# Patient Record
Sex: FEMALE | Race: WHITE | NOT HISPANIC OR LATINO | Employment: OTHER | URBAN - METROPOLITAN AREA
[De-identification: names, ages, dates, MRNs, and addresses within clinical notes are randomized per-mention and may not be internally consistent; named-entity substitution may affect disease eponyms.]

---

## 2020-03-06 NOTE — PATIENT DISCUSSION
RAE OU - LONGSTANDING. [facility]. [facility] ON S/S OF [address] MONITOR.

## 2020-05-22 NOTE — PATIENT DISCUSSION
EYELID INFLAMMATION RUL/TAHMINA - SUSPECT 2' EARLY STYE. NO FB OR LESION PRESENT ON LID INVERSION. RX MAXITROL 1 GTT TID X 1 WEEK THEN D/C. RX PRES FREE ATS QHOURLY X 24-48 HOURS THEN QID/PRN. ADVISED IF SWELLING WORSEN OR STYE BEGINS TO CALL AND WILL PLACE OR ANTIBIOTIC TO PHARMACY PENDING SYMPTOMS. MONITOR.

## 2020-05-22 NOTE — PATIENT DISCUSSION
New Prescription: Maxitrol (neomycin-polymyxin-dexameth): drops,suspension: 3.5-10,000-0.1 mg/mL-unit/mL-% 1 drop three times a day as directed into right eye 05-

## 2021-03-30 ENCOUNTER — CATARACT EVALUATION (OUTPATIENT)
Dept: URBAN - METROPOLITAN AREA CLINIC 39 | Facility: CLINIC | Age: 71
End: 2021-03-30

## 2021-03-30 DIAGNOSIS — H35.033: ICD-10-CM

## 2021-03-30 DIAGNOSIS — H43.813: ICD-10-CM

## 2021-03-30 DIAGNOSIS — H40.033: ICD-10-CM

## 2021-03-30 DIAGNOSIS — H18.513: ICD-10-CM

## 2021-03-30 DIAGNOSIS — H25.812: ICD-10-CM

## 2021-03-30 DIAGNOSIS — H25.811: ICD-10-CM

## 2021-03-30 DIAGNOSIS — H35.363: ICD-10-CM

## 2021-03-30 PROCEDURE — 92136TC INTERFEROMETRY - TECHNICAL COMPONENT

## 2021-03-30 PROCEDURE — 92025-1 CORNEAL TOPOGRAPHY, INS

## 2021-03-30 PROCEDURE — 92134 CPTRZ OPH DX IMG PST SGM RTA: CPT

## 2021-03-30 PROCEDURE — 92250 FUNDUS PHOTOGRAPHY W/I&R: CPT

## 2021-03-30 PROCEDURE — 92286 ANT SGM IMG I&R SPECLR MIC: CPT

## 2021-03-30 PROCEDURE — 99204 OFFICE O/P NEW MOD 45 MIN: CPT

## 2021-03-30 PROCEDURE — V2799PMN IMPRIMIS PRED-MOXI-NEPAF 5ML

## 2021-03-30 PROCEDURE — 92132 CPTRZD OPH DX IMG ANT SGM: CPT

## 2021-03-30 PROCEDURE — 92015 DETERMINE REFRACTIVE STATE: CPT

## 2021-03-30 ASSESSMENT — VISUAL ACUITY
OS_BAT: 20/80
OS_CC: 20/40+1
OD_CC: 20/25-2
OD_SC: 20/100
OD_BAT: 20/70
OS_SC: >J12
OD_SC: >J12
OS_CC: J4
OD_CC: J2
OS_SC: 20/100+1

## 2021-03-30 ASSESSMENT — TONOMETRY
OS_IOP_MMHG: 14
OD_IOP_MMHG: 14

## 2021-04-14 ENCOUNTER — TECH ONLY (OUTPATIENT)
Dept: URBAN - METROPOLITAN AREA CLINIC 39 | Facility: CLINIC | Age: 71
End: 2021-04-14

## 2021-04-14 ENCOUNTER — SURGERY/PROCEDURE (OUTPATIENT)
Dept: URBAN - METROPOLITAN AREA CLINIC 39 | Facility: CLINIC | Age: 71
End: 2021-04-14

## 2021-04-14 ENCOUNTER — ESTABLISHED PATIENT (OUTPATIENT)
Dept: URBAN - METROPOLITAN AREA SURGERY 14 | Facility: SURGERY | Age: 71
End: 2021-04-14

## 2021-04-14 DIAGNOSIS — H18.513: ICD-10-CM

## 2021-04-14 DIAGNOSIS — H25.811: ICD-10-CM

## 2021-04-14 DIAGNOSIS — H35.033: ICD-10-CM

## 2021-04-14 DIAGNOSIS — H40.033: ICD-10-CM

## 2021-04-14 DIAGNOSIS — Z96.1: ICD-10-CM

## 2021-04-14 DIAGNOSIS — H25.812: ICD-10-CM

## 2021-04-14 DIAGNOSIS — H43.813: ICD-10-CM

## 2021-04-14 DIAGNOSIS — H35.363: ICD-10-CM

## 2021-04-14 PROCEDURE — 6698454 REMOVE CATARACT;INSERT LENS (SX ONLY)

## 2021-04-14 PROCEDURE — 99211T TECH SERVICE

## 2021-04-14 PROCEDURE — 99211HP H&P OFFICE/OUTPATIENT VISIT, EST

## 2021-04-14 ASSESSMENT — TONOMETRY: OD_IOP_MMHG: 20

## 2021-04-14 ASSESSMENT — VISUAL ACUITY: OD_SC: 20/40

## 2021-05-05 ENCOUNTER — ESTABLISHED PATIENT (OUTPATIENT)
Dept: URBAN - METROPOLITAN AREA SURGERY 14 | Facility: SURGERY | Age: 71
End: 2021-05-05

## 2021-05-05 ENCOUNTER — TECH ONLY (OUTPATIENT)
Dept: URBAN - METROPOLITAN AREA CLINIC 39 | Facility: CLINIC | Age: 71
End: 2021-05-05

## 2021-05-05 ENCOUNTER — POST OP/EVAL OF SECOND EYE (OUTPATIENT)
Dept: URBAN - METROPOLITAN AREA CLINIC 39 | Facility: CLINIC | Age: 71
End: 2021-05-05

## 2021-05-05 ENCOUNTER — SURGERY/PROCEDURE (OUTPATIENT)
Dept: URBAN - METROPOLITAN AREA CLINIC 39 | Facility: CLINIC | Age: 71
End: 2021-05-05

## 2021-05-05 DIAGNOSIS — Z96.1: ICD-10-CM

## 2021-05-05 DIAGNOSIS — H25.812: ICD-10-CM

## 2021-05-05 PROCEDURE — 99213 OFFICE O/P EST LOW 20 MIN: CPT

## 2021-05-05 PROCEDURE — 99211T TECH SERVICE

## 2021-05-05 PROCEDURE — 6698454 REMOVE CATARACT;INSERT LENS (SX ONLY)

## 2021-05-05 PROCEDURE — 99211HP H&P OFFICE/OUTPATIENT VISIT, EST

## 2021-05-05 ASSESSMENT — VISUAL ACUITY
OS_PH: 20/40
OD_SC: 20/20-2
OS_SC: 20/100-1
OS_SC: 20/40-2

## 2021-05-05 ASSESSMENT — TONOMETRY
OS_IOP_MMHG: 13
OS_IOP_MMHG: 20
OD_IOP_MMHG: 13